# Patient Record
Sex: MALE | Race: BLACK OR AFRICAN AMERICAN | NOT HISPANIC OR LATINO | Employment: UNEMPLOYED | URBAN - METROPOLITAN AREA
[De-identification: names, ages, dates, MRNs, and addresses within clinical notes are randomized per-mention and may not be internally consistent; named-entity substitution may affect disease eponyms.]

---

## 2022-09-28 ENCOUNTER — TELEPHONE (OUTPATIENT)
Dept: PLASTIC SURGERY | Facility: CLINIC | Age: 37
End: 2022-09-28
Payer: MEDICAID

## 2022-09-28 NOTE — TELEPHONE ENCOUNTER
----- Message from Cintia Allan sent at 9/26/2022  5:57 PM CDT -----  Contact: self    ----- Message -----  From: Moriah Schaeffer  Sent: 9/26/2022   8:36 AM CDT  To: Yo FARRELL Staff    Pt called and asked for a call back regarding a surgery on his ear. Please call [phone]

## 2023-01-30 ENCOUNTER — OFFICE VISIT (OUTPATIENT)
Dept: PLASTIC SURGERY | Facility: CLINIC | Age: 38
End: 2023-01-30
Payer: MEDICAID

## 2023-01-30 VITALS
WEIGHT: 214 LBS | BODY MASS INDEX: 32.43 KG/M2 | HEART RATE: 82 BPM | HEIGHT: 68 IN | OXYGEN SATURATION: 96 % | SYSTOLIC BLOOD PRESSURE: 129 MMHG | DIASTOLIC BLOOD PRESSURE: 83 MMHG

## 2023-01-30 DIAGNOSIS — L91.0 KELOID SCAR: Primary | ICD-10-CM

## 2023-01-30 PROCEDURE — 3074F PR MOST RECENT SYSTOLIC BLOOD PRESSURE < 130 MM HG: ICD-10-PCS | Mod: CPTII,S$GLB,, | Performed by: SURGERY

## 2023-01-30 PROCEDURE — 3079F PR MOST RECENT DIASTOLIC BLOOD PRESSURE 80-89 MM HG: ICD-10-PCS | Mod: CPTII,S$GLB,, | Performed by: SURGERY

## 2023-01-30 PROCEDURE — 99204 PR OFFICE/OUTPT VISIT, NEW, LEVL IV, 45-59 MIN: ICD-10-PCS | Mod: S$GLB,,, | Performed by: SURGERY

## 2023-01-30 PROCEDURE — 3074F SYST BP LT 130 MM HG: CPT | Mod: CPTII,S$GLB,, | Performed by: SURGERY

## 2023-01-30 PROCEDURE — 99204 OFFICE O/P NEW MOD 45 MIN: CPT | Mod: S$GLB,,, | Performed by: SURGERY

## 2023-01-30 PROCEDURE — 3008F BODY MASS INDEX DOCD: CPT | Mod: CPTII,S$GLB,, | Performed by: SURGERY

## 2023-01-30 PROCEDURE — 3008F PR BODY MASS INDEX (BMI) DOCUMENTED: ICD-10-PCS | Mod: CPTII,S$GLB,, | Performed by: SURGERY

## 2023-01-30 PROCEDURE — 1159F MED LIST DOCD IN RCRD: CPT | Mod: CPTII,S$GLB,, | Performed by: SURGERY

## 2023-01-30 PROCEDURE — 1159F PR MEDICATION LIST DOCUMENTED IN MEDICAL RECORD: ICD-10-PCS | Mod: CPTII,S$GLB,, | Performed by: SURGERY

## 2023-01-30 PROCEDURE — 3079F DIAST BP 80-89 MM HG: CPT | Mod: CPTII,S$GLB,, | Performed by: SURGERY

## 2023-01-30 NOTE — Clinical Note
Left ear keloid  Prior auth OR Plan excision benign mass 5 cm Adjacent tissue transfer faceneck 25cmsq Refer to radiation

## 2023-01-30 NOTE — PROGRESS NOTES
CONSULTATION NOTE    CC  Skin MassLesion keloid to L ear     Referring Provider: self  PCP: Primary Doctor No    HPI  Roberto Jc is a 37 y.o. male presenting with keloid to L ear  History of excision and maybe injections > 10 yrs ago    Prior history of skin cancer: no    Family history of skin cancer:no         Paul Scale  5African American, No Burn    Accutane Use no    Tobacco Use no      FINAL PATHOLOGIC DIAGNOSIS   No Prior biopsy    PMH  Past Medical History:   Diagnosis Date    H/O keloid of skin     L ear          PSH  Past Surgical History:   Procedure Laterality Date    KELOID EXCISION  01/01/2001    x2 keloid grew back       FH  Family History   Problem Relation Age of Onset    Kidney failure Mother     No Known Problems Father     No Known Problems Sister     Diabetes Brother     Diabetes Maternal Aunt     Kidney disease Maternal Aunt     Diabetes Maternal Uncle     Kidney failure Maternal Uncle     No Known Problems Paternal Aunt     No Known Problems Paternal Uncle     No Known Problems Maternal Grandmother     Diabetes Maternal Grandfather     No Known Problems Paternal Grandmother     No Known Problems Cousin     No Known Problems Other        MEDICATIONS  No outpatient medications have been marked as taking for the 1/30/23 encounter (Office Visit) with Jill Ram MD.       ALLERGIES  Review of patient's allergies indicates:  No Known Allergies    SOCIAL HISTORY  Social History     Tobacco Use    Smoking status: Never    Smokeless tobacco: Never   Substance Use Topics    Alcohol use: Never    Drug use: Never       ROS  Review of Systems   Constitutional:  Negative for chills, fever and malaise/fatigue.   HENT:  Negative for congestion.    Eyes:  Negative for blurred vision and double vision.   Respiratory:  Negative for cough and sputum production.    Cardiovascular:  Negative for chest pain and palpitations.   Gastrointestinal:  Negative for nausea and vomiting.   Genitourinary:   "Negative for dysuria and hematuria.   Musculoskeletal:  Negative for back pain and joint pain.   Skin:  Negative for itching and rash.   Neurological:  Negative for dizziness, seizures and headaches.   Psychiatric/Behavioral:  Negative for depression. The patient is not nervous/anxious.        PHYSICAL EXAM  /83 (BP Location: Right arm, Patient Position: Sitting, BP Method: Small (Automatic))   Pulse 82   Ht 5' 8" (1.727 m)   Wt 97.1 kg (214 lb)   SpO2 96%   BMI 32.54 kg/m²      Constitutional: Pt is oriented to person, place, and time.  Pt appears well-developed and well-nourished.   HENT: Normocephalic and atraumatic.   Pulmonary/Chest: Effort normal. No respiratory distress.   Abdomen: Soft. Non-tender. No masses or distension.  Musculoskeletal: Normal range of motion. Pt exhibits no edema or deformity.   Neurological: Pt is alert and oriented to person, place, and time. No sensory deficit. Exhibits normal muscle tone.   Skin: Skin is warm. No rash noted. No erythema.              ASSESSMENT  Left ear keloid   Prior auth OR  Plan excision benign mass 5 cm  Adjacent tissue transfer 25cmsq  Refer to radiation  "

## 2023-03-13 ENCOUNTER — OFFICE VISIT (OUTPATIENT)
Dept: PLASTIC SURGERY | Facility: CLINIC | Age: 38
End: 2023-03-13
Payer: MEDICAID

## 2023-03-13 VITALS
SYSTOLIC BLOOD PRESSURE: 120 MMHG | WEIGHT: 214 LBS | HEIGHT: 68 IN | OXYGEN SATURATION: 99 % | DIASTOLIC BLOOD PRESSURE: 83 MMHG | HEART RATE: 62 BPM | BODY MASS INDEX: 32.43 KG/M2

## 2023-03-13 DIAGNOSIS — L91.0 KELOID SCAR: Primary | ICD-10-CM

## 2023-03-13 LAB
ANION GAP SERPL CALC-SCNC: 4 MMOL/L (ref 3–11)
BASOPHILS NFR BLD: 0.6 % (ref 0–3)
BUN SERPL-MCNC: 9 MG/DL (ref 7–18)
BUN/CREAT SERPL: 5.76 RATIO (ref 7–18)
CALCIUM SERPL-MCNC: 9.4 MG/DL (ref 8.8–10.5)
CHLORIDE SERPL-SCNC: 105 MMOL/L (ref 100–108)
CO2 SERPL-SCNC: 32 MMOL/L (ref 21–32)
CREAT SERPL-MCNC: 1.56 MG/DL (ref 0.7–1.3)
EOSINOPHIL NFR BLD: 3.4 % (ref 1–3)
ERYTHROCYTE [DISTWIDTH] IN BLOOD BY AUTOMATED COUNT: 12.3 % (ref 12.5–18)
GFR ESTIMATION: > 60
GLUCOSE SERPL-MCNC: 126 MG/DL (ref 70–110)
HCT VFR BLD AUTO: 46.3 % (ref 42–52)
HGB BLD-MCNC: 15.8 G/DL (ref 14–18)
LYMPHOCYTES NFR BLD: 44.4 % (ref 25–40)
MCH RBC QN AUTO: 29.8 PG (ref 27–31.2)
MCHC RBC AUTO-ENTMCNC: 34.1 G/DL (ref 31.8–35.4)
MCV RBC AUTO: 87.2 FL (ref 80–97)
MONOCYTES NFR BLD: 5.8 % (ref 1–15)
NEUTROPHILS # BLD AUTO: 2.42 10*3/UL (ref 1.8–7.7)
NEUTROPHILS NFR BLD: 45.4 % (ref 37–80)
NUCLEATED RED BLOOD CELLS: 0 %
PLATELETS: 203 10*3/UL (ref 142–424)
POTASSIUM SERPL-SCNC: 4.1 MMOL/L (ref 3.6–5.2)
RBC # BLD AUTO: 5.31 10*6/UL (ref 4.7–6.1)
SODIUM BLD-SCNC: 141 MMOL/L (ref 135–145)
WBC # BLD: 5.3 10*3/UL (ref 4.6–10.2)

## 2023-03-13 PROCEDURE — 3074F PR MOST RECENT SYSTOLIC BLOOD PRESSURE < 130 MM HG: ICD-10-PCS | Mod: CPTII,S$GLB,, | Performed by: SURGERY

## 2023-03-13 PROCEDURE — 3008F PR BODY MASS INDEX (BMI) DOCUMENTED: ICD-10-PCS | Mod: CPTII,S$GLB,, | Performed by: SURGERY

## 2023-03-13 PROCEDURE — 3008F BODY MASS INDEX DOCD: CPT | Mod: CPTII,S$GLB,, | Performed by: SURGERY

## 2023-03-13 PROCEDURE — 3079F PR MOST RECENT DIASTOLIC BLOOD PRESSURE 80-89 MM HG: ICD-10-PCS | Mod: CPTII,S$GLB,, | Performed by: SURGERY

## 2023-03-13 PROCEDURE — 99213 PR OFFICE/OUTPT VISIT, EST, LEVL III, 20-29 MIN: ICD-10-PCS | Mod: 57,S$GLB,, | Performed by: SURGERY

## 2023-03-13 PROCEDURE — 99213 OFFICE O/P EST LOW 20 MIN: CPT | Mod: 57,S$GLB,, | Performed by: SURGERY

## 2023-03-13 PROCEDURE — 3079F DIAST BP 80-89 MM HG: CPT | Mod: CPTII,S$GLB,, | Performed by: SURGERY

## 2023-03-13 PROCEDURE — 3074F SYST BP LT 130 MM HG: CPT | Mod: CPTII,S$GLB,, | Performed by: SURGERY

## 2023-03-13 RX ORDER — BACITRACIN 500 [USP'U]/G
OINTMENT TOPICAL 3 TIMES DAILY
Qty: 14 G | Refills: 1 | Status: SHIPPED | OUTPATIENT
Start: 2023-03-13 | End: 2023-03-27

## 2023-03-13 RX ORDER — ONDANSETRON 4 MG/1
4 TABLET, FILM COATED ORAL EVERY 6 HOURS PRN
Qty: 20 TABLET | Refills: 0 | Status: SHIPPED | OUTPATIENT
Start: 2023-03-13 | End: 2023-03-18

## 2023-03-13 RX ORDER — CEPHALEXIN 500 MG/1
500 CAPSULE ORAL EVERY 6 HOURS
Qty: 28 CAPSULE | Refills: 0 | Status: SHIPPED | OUTPATIENT
Start: 2023-03-13 | End: 2023-03-20

## 2023-03-13 RX ORDER — OXYCODONE AND ACETAMINOPHEN 5; 325 MG/1; MG/1
TABLET ORAL
Qty: 20 TABLET | Refills: 0 | Status: SHIPPED | OUTPATIENT
Start: 2023-03-13

## 2023-03-13 RX ORDER — KETOROLAC TROMETHAMINE 10 MG/1
10 TABLET, FILM COATED ORAL EVERY 6 HOURS
Qty: 20 TABLET | Refills: 0 | Status: SHIPPED | OUTPATIENT
Start: 2023-03-13 | End: 2023-03-18

## 2023-03-13 NOTE — PROGRESS NOTES
Preoperative exam     CC  Skin MassLesion keloid to L ear     Referring Provider: self  PCP: Primary Doctor No    HPI  Roberto Jc is a 37 y.o. male presenting with keloid to L ear  History of excision and maybe injections > 10 yrs ago    Prior history of skin cancer: no    Family history of skin cancer:no         Paul Scale  5African American, No Burn    Accutane Use no    Tobacco Use no      FINAL PATHOLOGIC DIAGNOSIS   No Prior biopsy    PMH  Past Medical History:   Diagnosis Date    H/O keloid of skin     L ear          PSH  Past Surgical History:   Procedure Laterality Date    KELOID EXCISION  01/01/2001    x2 keloid grew back       FH  Family History   Problem Relation Age of Onset    Kidney failure Mother     No Known Problems Father     No Known Problems Sister     Diabetes Brother     Diabetes Maternal Aunt     Kidney disease Maternal Aunt     Diabetes Maternal Uncle     Kidney failure Maternal Uncle     No Known Problems Paternal Aunt     No Known Problems Paternal Uncle     No Known Problems Maternal Grandmother     Diabetes Maternal Grandfather     No Known Problems Paternal Grandmother     No Known Problems Cousin     No Known Problems Other        MEDICATIONS  No outpatient medications have been marked as taking for the 3/13/23 encounter (Appointment) with Jill Ram MD.       ALLERGIES  Review of patient's allergies indicates:  No Known Allergies    SOCIAL HISTORY  Social History     Tobacco Use    Smoking status: Never    Smokeless tobacco: Never   Substance Use Topics    Alcohol use: Never    Drug use: Never       ROS  Review of Systems   Constitutional:  Negative for chills, fever and malaise/fatigue.   HENT:  Negative for congestion.    Eyes:  Negative for blurred vision and double vision.   Respiratory:  Negative for cough and sputum production.    Cardiovascular:  Negative for chest pain and palpitations.   Gastrointestinal:  Negative for nausea and vomiting.   Genitourinary:   Negative for dysuria and hematuria.   Musculoskeletal:  Negative for back pain and joint pain.   Skin:  Negative for itching and rash.   Neurological:  Negative for dizziness, seizures and headaches.   Psychiatric/Behavioral:  Negative for depression. The patient is not nervous/anxious.        PHYSICAL EXAM  There were no vitals taken for this visit.     Constitutional: Pt is oriented to person, place, and time.  Pt appears well-developed and well-nourished.   HENT: Normocephalic and atraumatic.   Pulmonary/Chest: Effort normal. No respiratory distress.   Abdomen: Soft. Non-tender. No masses or distension.  Musculoskeletal: Normal range of motion. Pt exhibits no edema or deformity.   Neurological: Pt is alert and oriented to person, place, and time. No sensory deficit. Exhibits normal muscle tone.   Skin: Skin is warm. No rash noted. No erythema.              ASSESSMENT  Left ear keloid   Prior auth OR  Plan excision benign mass 5 cm  Adjacent tissue transfer 25cmsq  Refer to radiation- done    INFORMED CONSENT  The proposed procedure, any treatment options, expected and possible outcomes including complications, any necessary perioperative medicinal and activity restrictions has, expected recovery and potential disability were fully reviewed with the patient. Permission to obtain photographs before, during, and after surgery was also granted. An opportunity to ask questions was given, and written informed consent was given to proceed. This Informed Consent discussion took place prior to the signing the consent form.    Procedure planned: Excision of lesion face    Risks of the procedure:  incomplete removal of lesion  recurrence  incomplete correction, failure to correct problem, worsening of problem  poor cosmetic outcome  need for further surgery  bleeding  infection  thick/poor scarring  wound separation, failure to heal  disability  pain  Numbness  Nerve injury  problems with anesthesia  blood clot, deep venous  thrombosis, pulmonary embolus  heart attack  stroke  death     The patient demonstrated understanding of risks and consent signed with RN witness.

## 2023-03-21 ENCOUNTER — OUTSIDE PLACE OF SERVICE (OUTPATIENT)
Dept: PLASTIC SURGERY | Facility: CLINIC | Age: 38
End: 2023-03-21

## 2023-03-21 LAB — COTININE, URINE: NORMAL

## 2023-03-21 PROCEDURE — 14301 TIS TRNFR ANY 30.1-60 SQ CM: CPT | Mod: ,,, | Performed by: SURGERY

## 2023-03-21 PROCEDURE — 14301 PR ADJ TISS XFER ANY AREA,30.1-60 SQCM: ICD-10-PCS | Mod: ,,, | Performed by: SURGERY

## 2023-03-22 LAB — SPECIMEN TO PATHOLOGY: NORMAL

## 2023-03-27 ENCOUNTER — OFFICE VISIT (OUTPATIENT)
Dept: PLASTIC SURGERY | Facility: CLINIC | Age: 38
End: 2023-03-27
Payer: MEDICAID

## 2023-03-27 VITALS
HEART RATE: 79 BPM | DIASTOLIC BLOOD PRESSURE: 80 MMHG | WEIGHT: 214 LBS | OXYGEN SATURATION: 97 % | BODY MASS INDEX: 32.43 KG/M2 | SYSTOLIC BLOOD PRESSURE: 118 MMHG | RESPIRATION RATE: 16 BRPM | HEIGHT: 68 IN

## 2023-03-27 DIAGNOSIS — L91.0 KELOID SCAR: Primary | ICD-10-CM

## 2023-03-27 PROCEDURE — 3079F DIAST BP 80-89 MM HG: CPT | Mod: CPTII,S$GLB,, | Performed by: SURGERY

## 2023-03-27 PROCEDURE — 3079F PR MOST RECENT DIASTOLIC BLOOD PRESSURE 80-89 MM HG: ICD-10-PCS | Mod: CPTII,S$GLB,, | Performed by: SURGERY

## 2023-03-27 PROCEDURE — 3008F BODY MASS INDEX DOCD: CPT | Mod: CPTII,S$GLB,, | Performed by: SURGERY

## 2023-03-27 PROCEDURE — 99024 PR POST-OP FOLLOW-UP VISIT: ICD-10-PCS | Mod: S$GLB,,, | Performed by: SURGERY

## 2023-03-27 PROCEDURE — 3074F PR MOST RECENT SYSTOLIC BLOOD PRESSURE < 130 MM HG: ICD-10-PCS | Mod: CPTII,S$GLB,, | Performed by: SURGERY

## 2023-03-27 PROCEDURE — 99024 POSTOP FOLLOW-UP VISIT: CPT | Mod: S$GLB,,, | Performed by: SURGERY

## 2023-03-27 PROCEDURE — 3008F PR BODY MASS INDEX (BMI) DOCUMENTED: ICD-10-PCS | Mod: CPTII,S$GLB,, | Performed by: SURGERY

## 2023-03-27 PROCEDURE — 3074F SYST BP LT 130 MM HG: CPT | Mod: CPTII,S$GLB,, | Performed by: SURGERY

## 2023-03-27 RX ORDER — CEPHALEXIN 500 MG/1
CAPSULE ORAL
COMMUNITY
Start: 2023-03-20

## 2023-03-27 NOTE — PROGRESS NOTES
POST-OPERATIVE EXAM    CC  1 week Post op    PROCEDURE  03/21/2023- Left ear keloid removal     SUBJECTIVE  Preoperative symptoms have improved.  No pain uncontrolled.    Denies fevers, drainage, or wound concerns.   4 radiation treatments with Dr. Wellington    PHYSICAL EXAM  Vitals:    03/27/23 1211   BP: 118/80   Pulse: 79   Resp: 16           Surgical site  Incisions clean dry and intact  No seroma or hematoma  Small dog ear      ASSESSMENT  No signs of complications.  Patient is doing well after surgery.   Allow time for contour and scar maturation.    PLAN  F/u in 1 week        WOUND CARE INSTRUCTIONS  BATHING  You may shower normally. No soaking tub bath or swimming pool until cleared by Dr. Ram.    WOUND CARE  You may leave the dressings off  Apply clear antibiotic ointment (Bacitracin) on incisions       MEDICATIONS  Continue your usual medications and vitamins    SCAR MANAGEMENT  Scars may take over 1 year to mature.  Some scars will remain pink, dark purple, and possibly raised for 6-9 months after surgery.  After one year, scars often become flatter, smoother, and may change color.  After removal of the tape, suture removal, or when glue was used, apply a thin layer of Aquaphor (available at any drugstore) or antibiotic ointment to the scars for another 2 weeks.  Begin silicone when scars smooth, generally starting about 2 weeks after surgery.  Medical grade silicone gel is available on companies' web sites or on amazon.com  Brands recommended:  - Biocorneum  - Skin medica Scar Recovery Gel Skin Medica  Massage the scar twice daily for about 30 seconds

## 2023-04-03 ENCOUNTER — OFFICE VISIT (OUTPATIENT)
Dept: PLASTIC SURGERY | Facility: CLINIC | Age: 38
End: 2023-04-03
Payer: MEDICAID

## 2023-04-03 VITALS
WEIGHT: 214 LBS | BODY MASS INDEX: 32.43 KG/M2 | HEIGHT: 68 IN | SYSTOLIC BLOOD PRESSURE: 121 MMHG | DIASTOLIC BLOOD PRESSURE: 80 MMHG | OXYGEN SATURATION: 92 % | HEART RATE: 76 BPM

## 2023-04-03 DIAGNOSIS — L91.0 KELOID SCAR: Primary | ICD-10-CM

## 2023-04-03 PROCEDURE — 3074F SYST BP LT 130 MM HG: CPT | Mod: CPTII,S$GLB,, | Performed by: SURGERY

## 2023-04-03 PROCEDURE — 3074F PR MOST RECENT SYSTOLIC BLOOD PRESSURE < 130 MM HG: ICD-10-PCS | Mod: CPTII,S$GLB,, | Performed by: SURGERY

## 2023-04-03 PROCEDURE — 3008F PR BODY MASS INDEX (BMI) DOCUMENTED: ICD-10-PCS | Mod: CPTII,S$GLB,, | Performed by: SURGERY

## 2023-04-03 PROCEDURE — 3008F BODY MASS INDEX DOCD: CPT | Mod: CPTII,S$GLB,, | Performed by: SURGERY

## 2023-04-03 PROCEDURE — 99024 POSTOP FOLLOW-UP VISIT: CPT | Mod: S$GLB,,, | Performed by: SURGERY

## 2023-04-03 PROCEDURE — 3079F DIAST BP 80-89 MM HG: CPT | Mod: CPTII,S$GLB,, | Performed by: SURGERY

## 2023-04-03 PROCEDURE — 3079F PR MOST RECENT DIASTOLIC BLOOD PRESSURE 80-89 MM HG: ICD-10-PCS | Mod: CPTII,S$GLB,, | Performed by: SURGERY

## 2023-04-03 PROCEDURE — 99024 PR POST-OP FOLLOW-UP VISIT: ICD-10-PCS | Mod: S$GLB,,, | Performed by: SURGERY

## 2023-04-03 NOTE — PROGRESS NOTES
"POST-OPERATIVE EXAM    CC  Post op    PROCEDURE Keloid excision 3/21/23  Completed radiation    SUBJECTIVE    Denies fevers, drainage, or wound concerns.     PHYSICAL EXAM  /80 (BP Location: Left arm, Patient Position: Sitting, BP Method: Large (Automatic))   Pulse 76   Ht 5' 8" (1.727 m)   Wt 97.1 kg (214 lb)   SpO2 (!) 92%   BMI 32.54 kg/m²     Surgical site  Incisions clean dry and intact  No seroma or hematoma        ASSESSMENT  No signs of complications.  Patient is doing well after surgery.   Allow time for contour and scar maturation.    PLAN  F/u 2 weeks        WOUND CARE INSTRUCTIONS  BATHING  You may shower normally. No soaking tub bath or swimming pool until cleared by Dr. Ram.    WOUND CARE  Apply clear antibiotic ointment (Bacitracin) on incisions    SUTURES  Sutures removed    ACTIVITY  Not ready to return tow ork    MEDICATIONS  Continue your usual medications and vitamins    SCAR MANAGEMENT  Scars may take over 1 year to mature.  Some scars will remain pink, dark purple, and possibly raised for 6-9 months after surgery.  After one year, scars often become flatter, smoother, and may change color.  After removal of the tape, suture removal, or when glue was used, apply a thin layer of Aquaphor (available at any drugstore) or antibiotic ointment to the scars for another 2 weeks.  Begin silicone when scars smooth, generally starting about 2 weeks after surgery.  Medical grade silicone gel is available on companies' web sites or on amazon.com  Brands recommended:  - Biocorneum  - Skin [employer] Scar Recovery Gel Skin Medica  Massage the scar twice daily for about 30 seconds    "

## 2023-04-04 ENCOUNTER — TELEPHONE (OUTPATIENT)
Dept: PLASTIC SURGERY | Facility: CLINIC | Age: 38
End: 2023-04-04
Payer: MEDICAID

## 2023-04-04 NOTE — TELEPHONE ENCOUNTER
Lvm for pt to call back in regards to getting him scheduled to have stitch removed from ear ----- Message from Speedy Lehman sent at 4/4/2023  1:24 PM CDT -----  Contact: Pt  States he's calling rg having a small pc of stitch still in his ear and wants to know if he can pull it out or does he need to be seen to have it removed and can be reached at [phone]//thanks/dbw     
Truong Silva, IVONNEM

## 2023-04-06 ENCOUNTER — OFFICE VISIT (OUTPATIENT)
Dept: PLASTIC SURGERY | Facility: CLINIC | Age: 38
End: 2023-04-06
Payer: MEDICAID

## 2023-04-06 DIAGNOSIS — L91.0 KELOID SCAR: Primary | ICD-10-CM

## 2023-04-06 PROCEDURE — 99024 PR POST-OP FOLLOW-UP VISIT: ICD-10-PCS | Mod: S$GLB,,, | Performed by: SURGERY

## 2023-04-06 PROCEDURE — 99024 POSTOP FOLLOW-UP VISIT: CPT | Mod: S$GLB,,, | Performed by: SURGERY

## 2023-04-06 NOTE — PROGRESS NOTES
POST-OPERATIVE EXAM    CC  Post op    PROCEDURE Keloid excision 3/21/23  Completed radiation    SUBJECTIVE    Denies fevers, drainage, or wound concerns.     PHYSICAL EXAM       Surgical site  Incisions clean dry and intact  No seroma or hematoma        ASSESSMENT  No signs of complications.  Patient is doing well after surgery.   Allow time for contour and scar maturation.       PLAN  F/u 2 weeks        WOUND CARE INSTRUCTIONS  BATHING  You may shower normally. No soaking tub bath or swimming pool until cleared by Dr. Ram.    WOUND CARE  Apply clear antibiotic ointment (Bacitracin) on incisions    SUTURES  Sutures removed    ACTIVITY  Not ready to return tow ork    MEDICATIONS  Continue your usual medications and vitamins    SCAR MANAGEMENT  Scars may take over 1 year to mature.  Some scars will remain pink, dark purple, and possibly raised for 6-9 months after surgery.  After one year, scars often become flatter, smoother, and may change color.  After removal of the tape, suture removal, or when glue was used, apply a thin layer of Aquaphor (available at any drugstore) or antibiotic ointment to the scars for another 2 weeks.  Begin silicone when scars smooth, generally starting about 2 weeks after surgery.  Medical grade silicone gel is available on companies' web sites or on amazon.com  Brands recommended:  - Biocorneum  - Skin [employer] Scar Recovery Gel Skin Medica  Massage the scar twice daily for about 30 seconds

## 2023-04-17 ENCOUNTER — OFFICE VISIT (OUTPATIENT)
Dept: PLASTIC SURGERY | Facility: CLINIC | Age: 38
End: 2023-04-17
Payer: MEDICAID

## 2023-04-17 VITALS
SYSTOLIC BLOOD PRESSURE: 115 MMHG | HEIGHT: 68 IN | WEIGHT: 214 LBS | BODY MASS INDEX: 32.43 KG/M2 | HEART RATE: 86 BPM | OXYGEN SATURATION: 98 % | RESPIRATION RATE: 14 BRPM | DIASTOLIC BLOOD PRESSURE: 81 MMHG

## 2023-04-17 DIAGNOSIS — L91.0 KELOID SCAR: Primary | ICD-10-CM

## 2023-04-17 PROCEDURE — 3079F DIAST BP 80-89 MM HG: CPT | Mod: CPTII,S$GLB,, | Performed by: SURGERY

## 2023-04-17 PROCEDURE — 3008F BODY MASS INDEX DOCD: CPT | Mod: CPTII,S$GLB,, | Performed by: SURGERY

## 2023-04-17 PROCEDURE — 3008F PR BODY MASS INDEX (BMI) DOCUMENTED: ICD-10-PCS | Mod: CPTII,S$GLB,, | Performed by: SURGERY

## 2023-04-17 PROCEDURE — 3074F SYST BP LT 130 MM HG: CPT | Mod: CPTII,S$GLB,, | Performed by: SURGERY

## 2023-04-17 PROCEDURE — 99024 POSTOP FOLLOW-UP VISIT: CPT | Mod: S$GLB,,, | Performed by: SURGERY

## 2023-04-17 PROCEDURE — 3079F PR MOST RECENT DIASTOLIC BLOOD PRESSURE 80-89 MM HG: ICD-10-PCS | Mod: CPTII,S$GLB,, | Performed by: SURGERY

## 2023-04-17 PROCEDURE — 3074F PR MOST RECENT SYSTOLIC BLOOD PRESSURE < 130 MM HG: ICD-10-PCS | Mod: CPTII,S$GLB,, | Performed by: SURGERY

## 2023-04-17 PROCEDURE — 99024 PR POST-OP FOLLOW-UP VISIT: ICD-10-PCS | Mod: S$GLB,,, | Performed by: SURGERY

## 2023-04-17 PROCEDURE — 1159F MED LIST DOCD IN RCRD: CPT | Mod: CPTII,S$GLB,, | Performed by: SURGERY

## 2023-04-17 PROCEDURE — 1159F PR MEDICATION LIST DOCUMENTED IN MEDICAL RECORD: ICD-10-PCS | Mod: CPTII,S$GLB,, | Performed by: SURGERY

## 2023-04-17 NOTE — PROGRESS NOTES
"POST-OPERATIVE EXAM    CC  1 Month post op    PROCEDURE Keloid excision 3/21/23  Completed radiation    SUBJECTIVE    Denies fevers, drainage  Small eschar  Returns to work Monday     PHYSICAL EXAM  /81   Pulse 86   Resp 14   Ht 5' 8" (1.727 m)   Wt 97.1 kg (214 lb)   SpO2 98%   BMI 32.54 kg/m²     Surgical site  Incisions clean dry and intact small < 1 cm eschar no recurrence or hypertrophy at this time   No seroma or hematoma        ASSESSMENT  Small wound  Patient is doing well after surgery.   Allow time for contour and scar maturation.    PLAN  Apply ointment to incision TID  Continue scar massages   Return to work okay   F/U 2 weeks       WOUND CARE INSTRUCTIONS  BATHING  You may shower normally. No soaking tub bath or swimming pool until cleared by Dr. Ram.    WOUND CARE  Apply clear antibiotic ointment (Bacitracin) on incisions    SUTURES  Sutures removed         MEDICATIONS  Continue your usual medications and vitamins    SCAR MANAGEMENT  Scars may take over 1 year to mature.  Some scars will remain pink, dark purple, and possibly raised for 6-9 months after surgery.  After one year, scars often become flatter, smoother, and may change color.  After removal of the tape, suture removal, or when glue was used, apply a thin layer of Aquaphor (available at any drugstore) or antibiotic ointment to the scars for another 2 weeks.  Begin silicone when scars smooth, generally starting about 2 weeks after surgery.  Medical grade silicone gel is available on companies' web sites or on amazon.com  Brands recommended:  - Biocorneum  - Skin [employer] Scar Recovery Gel Skin Med[employer]  Massage the scar twice daily for about 30 seconds    "

## 2023-05-15 ENCOUNTER — OFFICE VISIT (OUTPATIENT)
Dept: PLASTIC SURGERY | Facility: CLINIC | Age: 38
End: 2023-05-15
Payer: MEDICAID

## 2023-05-15 VITALS
DIASTOLIC BLOOD PRESSURE: 83 MMHG | BODY MASS INDEX: 33.74 KG/M2 | HEART RATE: 70 BPM | WEIGHT: 215 LBS | HEIGHT: 67 IN | SYSTOLIC BLOOD PRESSURE: 126 MMHG | OXYGEN SATURATION: 98 %

## 2023-05-15 DIAGNOSIS — L91.0 KELOID SCAR: Primary | ICD-10-CM

## 2023-05-15 PROCEDURE — 3008F PR BODY MASS INDEX (BMI) DOCUMENTED: ICD-10-PCS | Mod: CPTII,S$GLB,, | Performed by: SURGERY

## 2023-05-15 PROCEDURE — 99024 POSTOP FOLLOW-UP VISIT: CPT | Mod: S$GLB,,, | Performed by: SURGERY

## 2023-05-15 PROCEDURE — 3079F PR MOST RECENT DIASTOLIC BLOOD PRESSURE 80-89 MM HG: ICD-10-PCS | Mod: CPTII,S$GLB,, | Performed by: SURGERY

## 2023-05-15 PROCEDURE — 1159F MED LIST DOCD IN RCRD: CPT | Mod: CPTII,S$GLB,, | Performed by: SURGERY

## 2023-05-15 PROCEDURE — 3074F SYST BP LT 130 MM HG: CPT | Mod: CPTII,S$GLB,, | Performed by: SURGERY

## 2023-05-15 PROCEDURE — 3079F DIAST BP 80-89 MM HG: CPT | Mod: CPTII,S$GLB,, | Performed by: SURGERY

## 2023-05-15 PROCEDURE — 1159F PR MEDICATION LIST DOCUMENTED IN MEDICAL RECORD: ICD-10-PCS | Mod: CPTII,S$GLB,, | Performed by: SURGERY

## 2023-05-15 PROCEDURE — 3074F PR MOST RECENT SYSTOLIC BLOOD PRESSURE < 130 MM HG: ICD-10-PCS | Mod: CPTII,S$GLB,, | Performed by: SURGERY

## 2023-05-15 PROCEDURE — 99024 PR POST-OP FOLLOW-UP VISIT: ICD-10-PCS | Mod: S$GLB,,, | Performed by: SURGERY

## 2023-05-15 PROCEDURE — 3008F BODY MASS INDEX DOCD: CPT | Mod: CPTII,S$GLB,, | Performed by: SURGERY

## 2023-05-15 NOTE — PROGRESS NOTES
"POST-OPERATIVE EXAM  Keep massaging area   CC  6 week follow up    PROCEDURE Keloid excision 3/21/23  Completed radiation    SUBJECTIVE    Denies fevers, drainage  Small eschar  Returns to work Monday     PHYSICAL EXAM  /83   Pulse 70   Ht 5' 7" (1.702 m)   Wt 97.5 kg (215 lb)   SpO2 98%   BMI 33.67 kg/m²     Surgical site  Incisions clean dry and intact small < 1 cm eschar no recurrence or hypertrophy at this time   Minimal fibrosis         ASSESSMENT  Small wound  Patient is doing well after surgery.   Allow time for contour and scar maturation.    PLAN    Continue scar massages   Return to work okay   Continue daily baci TID  F/U 1 month      SCAR MANAGEMENT  Scars may take over 1 year to mature.  Some scars will remain pink, dark purple, and possibly raised for 6-9 months after surgery.  After one year, scars often become flatter, smoother, and may change color.  After removal of the tape, suture removal, or when glue was used, apply a thin layer of Aquaphor (available at any drugstore) or antibiotic ointment to the scars for another 2 weeks.  Begin silicone when scars smooth, generally starting about 2 weeks after surgery.  Medical grade silicone gel is available on companies' web sites or on amazon.com  Brands recommended:  - Biocorneum  - Skin [employer] Scar Recovery Gel Skin Medica  Massage the scar twice daily for about 30 seconds      "

## 2023-07-31 ENCOUNTER — PATIENT MESSAGE (OUTPATIENT)
Dept: RESEARCH | Facility: HOSPITAL | Age: 38
End: 2023-07-31
Payer: MEDICAID